# Patient Record
Sex: FEMALE | Race: BLACK OR AFRICAN AMERICAN | NOT HISPANIC OR LATINO | Employment: FULL TIME | URBAN - METROPOLITAN AREA
[De-identification: names, ages, dates, MRNs, and addresses within clinical notes are randomized per-mention and may not be internally consistent; named-entity substitution may affect disease eponyms.]

---

## 2020-07-20 ENCOUNTER — OFFICE VISIT (OUTPATIENT)
Dept: INTERNAL MEDICINE | Facility: CLINIC | Age: 25
End: 2020-07-20

## 2020-07-20 ENCOUNTER — LAB (OUTPATIENT)
Dept: LAB | Facility: HOSPITAL | Age: 25
End: 2020-07-20

## 2020-07-20 VITALS
HEIGHT: 64 IN | RESPIRATION RATE: 16 BRPM | DIASTOLIC BLOOD PRESSURE: 72 MMHG | OXYGEN SATURATION: 90 % | SYSTOLIC BLOOD PRESSURE: 102 MMHG | BODY MASS INDEX: 17.11 KG/M2 | WEIGHT: 100.2 LBS | HEART RATE: 74 BPM | TEMPERATURE: 97.8 F

## 2020-07-20 DIAGNOSIS — R49.0 HOARSENESS: ICD-10-CM

## 2020-07-20 DIAGNOSIS — Z11.59 ENCOUNTER FOR HEPATITIS C SCREENING TEST FOR LOW RISK PATIENT: ICD-10-CM

## 2020-07-20 DIAGNOSIS — R10.31 RLQ ABDOMINAL PAIN: Primary | ICD-10-CM

## 2020-07-20 DIAGNOSIS — R10.31 RLQ ABDOMINAL PAIN: ICD-10-CM

## 2020-07-20 PROCEDURE — 99204 OFFICE O/P NEW MOD 45 MIN: CPT | Performed by: INTERNAL MEDICINE

## 2020-07-20 RX ORDER — FLUTICASONE PROPIONATE 50 MCG
2 SPRAY, SUSPENSION (ML) NASAL DAILY
Qty: 1 BOTTLE | Refills: 5 | Status: SHIPPED | OUTPATIENT
Start: 2020-07-20 | End: 2021-06-29

## 2020-07-20 NOTE — PATIENT INSTRUCTIONS
Low-FODMAP Eating Plan    FODMAPs (fermentable oligosaccharides, disaccharides, monosaccharides, and polyols) are sugars that are hard for some people to digest. A low-FODMAP eating plan may help some people who have bowel (intestinal) diseases to manage their symptoms.  This meal plan can be complicated to follow. Work with a diet and nutrition specialist (dietitian) to make a low-FODMAP eating plan that is right for you. A dietitian can make sure that you get enough nutrition from this diet.  What are tips for following this plan?  Reading food labels  · Check labels for hidden FODMAPs such as:  ? High-fructose syrup.  ? Honey.  ? Agave.  ? Natural fruit flavors.  ? Onion or garlic powder.  · Choose low-FODMAP foods that contain 3-4 grams of fiber per serving.  · Check food labels for serving sizes. Eat only one serving at a time to make sure FODMAP levels stay low.  Meal planning  · Follow a low-FODMAP eating plan for up to 6 weeks, or as told by your health care provider or dietitian.  · To follow the eating plan:  1. Eliminate high-FODMAP foods from your diet completely.  2. Gradually reintroduce high-FODMAP foods into your diet one at a time. Most people should wait a few days after introducing one high-FODMAP food before they introduce the next high-FODMAP food. Your dietitian can recommend how quickly you may reintroduce foods.  3. Keep a daily record of what you eat and drink, and make note of any symptoms that you have after eating.  4. Review your daily record with a dietitian regularly. Your dietitian can help you identify which foods you can eat and which foods you should avoid.  General tips  · Drink enough fluid each day to keep your urine pale yellow.  · Avoid processed foods. These often have added sugar and may be high in FODMAPs.  · Avoid most dairy products, whole grains, and sweeteners.  · Work with a dietitian to make sure you get enough fiber in your diet.  Recommended  "foods  Grains  · Gluten-free grains, such as rice, oats, buckwheat, quinoa, corn, polenta, and millet. Gluten-free pasta, bread, or cereal. Rice noodles. Corn tortillas.  Vegetables  · Eggplant, zucchini, cucumber, peppers, green beans, [place] sprouts, bean sprouts, lettuce, arugula, kale, Swiss chard, spinach, annabel greens, bok sissy, summer squash, potato, and tomato. Limited amounts of corn, carrot, and sweet potato. Green parts of scallions.  Fruits  · Bananas, oranges, le, limes, blueberries, raspberries, strawberries, grapes, cantaloupe, honeydew melon, kiwi, papaya, passion fruit, and pineapple. Limited amounts of dried cranberries, banana chips, and shredded coconut.  Dairy  · Lactose-free milk, yogurt, and kefir. Lactose-free cottage cheese and ice cream. Non-dairy milks, such as almond, coconut, hemp, and rice milk. Yogurts made of non-dairy milks. Limited amounts of goat cheese, brie, mozzarella, parmesan, swiss, and other hard cheeses.  Meats and other protein foods  · Unseasoned beef, pork, poultry, or fish. Eggs. Vidal. Tofu (firm) and tempeh. Limited amounts of nuts and seeds, such as almonds, walnuts, brazil nuts, pecans, peanuts, pumpkin seeds, saeed seeds, and sunflower seeds.  Fats and oils  · Butter-free spreads. Vegetable oils, such as olive, canola, and sunflower oil.  Seasoning and other foods  · Artificial sweeteners with names that do not end in \"ol\" such as aspartame, saccharine, and stevia. Maple syrup, white table sugar, raw sugar, brown sugar, and molasses. Fresh basil, coriander, parsley, rosemary, and thyme.  Beverages  · Water and mineral water. Sugar-sweetened soft drinks. Small amounts of orange juice or cranberry juice. Black and green tea. Most dry sarita. Coffee.  This may not be a complete list of low-FODMAP foods. Talk with your dietitian for more information.  Foods to avoid  Grains  · Wheat, including kamut, durum, and semolina. Barley and bulgur. Couscous. Wheat-based " cereals. Wheat noodles, bread, crackers, and pastries.  Vegetables  · Chicory root, artichoke, asparagus, cabbage, snow peas, sugar snap peas, mushrooms, and cauliflower. Onions, garlic, leeks, and the white part of scallions.  Fruits  · Fresh, dried, and juiced forms of apple, pear, watermelon, peach, plum, cherries, apricots, blackberries, boysenberries, figs, nectarines, and gena. Avocado.  Dairy  · Milk, yogurt, ice cream, and soft cheese. Cream and sour cream. Milk-based sauces. Custard.  Meats and other protein foods  · Fried or fatty meat. Sausage. Cashews and pistachios. Soybeans, baked beans, black beans, chickpeas, kidney beans, helder beans, navy beans, lentils, and split peas.  Seasoning and other foods  · Any sugar-free gum or candy. Foods that contain artificial sweeteners such as sorbitol, mannitol, isomalt, or xylitol. Foods that contain honey, high-fructose corn syrup, or agave. Bouillon, vegetable stock, beef stock, and chicken stock. Garlic and onion powder. Condiments made with onion, such as hummus, chutney, pickles, relish, salad dressing, and salsa. Tomato paste.  Beverages  · Chicory-based drinks. Coffee substitutes. Chamomile tea. Fennel tea. Sweet or fortified sarita such as port or freddy. Diet soft drinks made with isomalt, mannitol, maltitol, sorbitol, or xylitol. Apple, pear, and gena juice. Juices with high-fructose corn syrup.  This may not be a complete list of high-FODMAP foods. Talk with your dietitian to discuss what dietary choices are best for you.   Summary  · A low-FODMAP eating plan is a short-term diet that eliminates FODMAPs from your diet to help ease symptoms of certain bowel diseases.  · The eating plan usually lasts up to 6 weeks. After that, high-FODMAP foods are restarted gradually, one at a time, so you can find out which may be causing symptoms.  · A low-FODMAP eating plan can be complicated. It is best to work with a dietitian who has experience with this type of  plan.  This information is not intended to replace advice given to you by your health care provider. Make sure you discuss any questions you have with your health care provider.  Document Released: 08/14/2018 Document Revised: 11/30/2018 Document Reviewed: 08/14/2018  Elsevier Patient Education © 2020 [employer]

## 2020-07-20 NOTE — PROGRESS NOTES
Internal Medicine New Patient  Lissa Cortés is a 24 y.o. female who presents today to establish care and with concerns as outlined below.    Chief Complaint  Chief Complaint   Patient presents with   • Establish Care     Throat pain after talking for extedned periods, and after yelling   • Abdominal Pain     Left lower quad, stabbing pain after eating,going to bathroom does not relieve it,x2weeks        HPI  Lissa comes in today to establish care. She had previously been seen by Dr. Hill and an APRN named Meri but last seen about 2 years ago. She has no chronic medical problems and takes no medications. She has never had pap smear.    She notes intermittent dull aching pain in RLQ. The pain has been present for the last 3 months and occurs about once a week. Feels like a fullness and pressure. The pain is associated with eating. The pain is not relieved with BM. She has BM 1-2 times a day. She denies diarrhea or constipation. She does not have blood in stool. She denies change in weight. She has not had N/V, fever, night sweats, dysuria, vaginal discharge, vaginal irritation. Pain is relieved with rest, will go away after sleeping. She denies issues with menstruation. LMP 1 week ago.    She feels that voice has been straining with prolonged talking. May get dry cough and throat may get sore. Has to clear throat often.       Review of Systems  Review of Systems   Constitutional: Negative.    HENT: Positive for postnasal drip, sore throat and voice change.    Eyes: Negative.    Respiratory: Negative.    Cardiovascular: Negative.    Gastrointestinal: Positive for abdominal pain. Negative for abdominal distention, anal bleeding, blood in stool, constipation, diarrhea, nausea, vomiting, GERD and indigestion.   Genitourinary: Negative.    Musculoskeletal: Negative.    Neurological: Negative.    Psychiatric/Behavioral: Negative.         Past Medical History  History reviewed. No pertinent past medical history.  "    Surgical History  History reviewed. No pertinent surgical history.     Family History  Family History   Problem Relation Age of Onset   • Hyperlipidemia Father    • Hypertension Father    • Diabetes Maternal Grandmother         Social History  Social History     Socioeconomic History   • Marital status: Single     Spouse name: Not on file   • Number of children: Not on file   • Years of education: Not on file   • Highest education level: Not on file   Tobacco Use   • Smoking status: Never Smoker   • Smokeless tobacco: Never Used   Substance and Sexual Activity   • Alcohol use: Yes     Comment: Socially   • Drug use: Never   • Sexual activity: Not Currently        Current Medications  No current outpatient medications on file prior to visit.     No current facility-administered medications on file prior to visit.        Allergies  No Known Allergies     Objective  Visit Vitals  /72   Pulse 74   Temp 97.8 °F (36.6 °C)   Resp 16   Ht 162.6 cm (64\")   Wt 45.5 kg (100 lb 3.2 oz)   SpO2 90%   BMI 17.20 kg/m²        Physical Exam  Physical Exam   Constitutional: She is oriented to person, place, and time. She appears well-developed and well-nourished. No distress.   HENT:   Head: Normocephalic and atraumatic.   Right Ear: External ear normal.   Left Ear: External ear normal.   Nose: Nose normal.   Mouth/Throat: Mucous membranes are normal. Posterior oropharyngeal erythema (cobblestoning, post nasal drainage) present. No oropharyngeal exudate. Tonsils are 1+ on the right. Tonsils are 1+ on the left. No tonsillar exudate.   Eyes: Pupils are equal, round, and reactive to light. Conjunctivae and EOM are normal. No scleral icterus.   Neck: Neck supple. No thyromegaly present.   Cardiovascular: Normal rate, regular rhythm and normal heart sounds.   No murmur heard.  Pulmonary/Chest: Effort normal and breath sounds normal. No respiratory distress.   Abdominal: Soft. Normal appearance and bowel sounds are normal. She " exhibits no distension and no mass. There is no hepatosplenomegaly. There is tenderness in the right lower quadrant. There is no rigidity, no rebound, no guarding, no CVA tenderness, no tenderness at McBurney's point and negative Frances's sign. No hernia.   Musculoskeletal: She exhibits no edema or deformity.   Lymphadenopathy:     She has no cervical adenopathy.   Neurological: She is alert and oriented to person, place, and time.   Skin: Skin is warm and dry. No rash noted. She is not diaphoretic.   Psychiatric: She has a normal mood and affect. Her behavior is normal. Judgment and thought content normal.   Nursing note and vitals reviewed.       Results  No results found for this or any previous visit.     Assessment and Plan  Lissa was seen today for establish care and abdominal pain.    Diagnoses and all orders for this visit:    RLQ abdominal pain  - Intermittent dull RLQ pain not associated with change in bowel habits, N/V, fever, weight loss, urinary symptoms. Notes onset of pain after eating. Nontoxic appearing and exam negative for significant findings except for mild tenderness on deep palpation of RLQ.   - Etiology of her pain at this time either dietary intolerance versus ovarian cyst or other  pathology. Has never had a pap smear.  - Will obtain CBC and CMP. Declines UPT, adamant that she is not pregnant.  - Advised to keep food diary and eliminate potential causative foods.  - Will follow up in 1 month for pap smear and if pain persists order TVUS to evaluate ovaries, uterus.    Hoarseness  - Exam with postnasal drainage which may be cause of her voice changes, soreness, frequent throat clearing.  - Flonase prescribed.    Encounter for hepatitis C screening test for low risk patient  - HCV ab ordered    Health Maintenance   Topic Date Due   • HPV VACCINES (1 - Female 2-dose series) 12/04/2007   • TDAP/TD VACCINES (2 - Td) 08/07/2018   • HEPATITIS C SCREENING  07/20/2020   • MEDICARE ANNUAL  WELLNESS  07/20/2020   • CHLAMYDIA SCREENING  07/20/2020   • PAP SMEAR  07/20/2020   • INFLUENZA VACCINE  08/01/2020     Health Maintenance  - Pap smear: plan for pap smear at next visit  - Mammogram: Start screening at age 40.  - Colonoscopy: Start screening at age 45.  - HCV: ordered  - Immunizations: Tdap due, discuss next visit.  - Depression screening: negative 7/2020    Return in about 1 month (around 8/20/2020) for pap smear 30 minutes please, Labs today.

## 2021-06-29 ENCOUNTER — OFFICE VISIT (OUTPATIENT)
Dept: INTERNAL MEDICINE | Facility: CLINIC | Age: 26
End: 2021-06-29

## 2021-06-29 VITALS
DIASTOLIC BLOOD PRESSURE: 70 MMHG | HEIGHT: 64 IN | BODY MASS INDEX: 16.9 KG/M2 | TEMPERATURE: 97.3 F | SYSTOLIC BLOOD PRESSURE: 108 MMHG | RESPIRATION RATE: 16 BRPM | OXYGEN SATURATION: 99 % | WEIGHT: 99 LBS | HEART RATE: 90 BPM

## 2021-06-29 DIAGNOSIS — Z71.84 TRAVEL ADVICE ENCOUNTER: Primary | ICD-10-CM

## 2021-06-29 DIAGNOSIS — Z01.419 WELL WOMAN EXAM WITH ROUTINE GYNECOLOGICAL EXAM: ICD-10-CM

## 2021-06-29 LAB
INDURATION: 0 MM (ref 0–10)
Lab: NORMAL
TB SKIN TEST: NEGATIVE

## 2021-06-29 PROCEDURE — 99213 OFFICE O/P EST LOW 20 MIN: CPT | Performed by: INTERNAL MEDICINE

## 2021-07-02 ENCOUNTER — TELEPHONE (OUTPATIENT)
Dept: INTERNAL MEDICINE | Facility: CLINIC | Age: 26
End: 2021-07-02

## 2021-07-02 NOTE — TELEPHONE ENCOUNTER
Patient stopped by to get print out of tb skin test results. Logan should be working on this please give patient a call when ready for      Please advise     PLEASE DISREGARD MESSAGE PT STOPPED IN AND PICKED UP FORM

## 2021-08-23 ENCOUNTER — OFFICE VISIT (OUTPATIENT)
Dept: INTERNAL MEDICINE | Facility: CLINIC | Age: 26
End: 2021-08-23

## 2021-08-23 VITALS
SYSTOLIC BLOOD PRESSURE: 112 MMHG | RESPIRATION RATE: 16 BRPM | HEIGHT: 64 IN | HEART RATE: 99 BPM | BODY MASS INDEX: 17.75 KG/M2 | OXYGEN SATURATION: 99 % | TEMPERATURE: 97.3 F | WEIGHT: 104 LBS | DIASTOLIC BLOOD PRESSURE: 60 MMHG

## 2021-08-23 DIAGNOSIS — Z72.51 UNPROTECTED SEX: ICD-10-CM

## 2021-08-23 DIAGNOSIS — Z00.00 ANNUAL PHYSICAL EXAM: Primary | ICD-10-CM

## 2021-08-23 DIAGNOSIS — Z13.1 SCREENING FOR DIABETES MELLITUS: ICD-10-CM

## 2021-08-23 PROCEDURE — 99395 PREV VISIT EST AGE 18-39: CPT | Performed by: INTERNAL MEDICINE

## 2021-08-23 NOTE — PROGRESS NOTES
Internal Medicine Annual Exam  Lissa Cortés is a 25 y.o. female who presents today for an annual exam and with concerns as outlined below.    Chief Complaint  Chief Complaint   Patient presents with   • Annual Exam        HPI  Lissa comes in today for annual physical. She also brings paperwork with her for her upcoming travel to [place] x9 months. She continues to feel well. No medications. She has recently become sexually active. She currently has a single male partner. She generally uses condoms but had one episode of unprotected sex recently. No symptoms but worries about STI. She does not think she is pregnant and defers pregnancy test as she worries her parents will see that she had this ordered. She is on their insurance. She is not physically active but plans to join the gym once in [place]. She leaves on 9/9.  She is UTD with vision and dental exams. She has had her COVID vaccines. She is not sure of the status of other vaccinations but will check with her parents. She has an appt on 8/26 with Dr. Calderon for pap smear and plans to discuss contraception as well.       Review of Systems  Review of Systems   Constitutional: Negative.    Eyes: Negative.    Respiratory: Negative.    Cardiovascular: Negative.    Gastrointestinal: Negative.    Genitourinary: Negative.    Musculoskeletal: Negative.    Skin: Negative.    Neurological: Negative.    Psychiatric/Behavioral: Negative.         Past Medical History  History reviewed. No pertinent past medical history.     Surgical History  Past Surgical History:   Procedure Laterality Date   • WISDOM TOOTH EXTRACTION Bilateral         Family History  Family History   Problem Relation Age of Onset   • Hyperlipidemia Father    • Hypertension Father    • Diabetes Maternal Grandmother         Social History  Social History     Socioeconomic History   • Marital status: Single     Spouse name: Not on file   • Number of children: Not on file   • Years of education: Not on  "file   • Highest education level: Not on file   Tobacco Use   • Smoking status: Never Smoker   • Smokeless tobacco: Never Used   Vaping Use   • Vaping Use: Never used   Substance and Sexual Activity   • Alcohol use: Yes     Comment: Socially, can drink a bottle of wine in a day   • Drug use: Never   • Sexual activity: Not Currently     Partners: Male     Birth control/protection: Condom        Current Medications  No current outpatient medications on file prior to visit.     No current facility-administered medications on file prior to visit.       Allergies  No Known Allergies     Objective  Visit Vitals  /60   Pulse 99   Temp 97.3 °F (36.3 °C)   Resp 16   Ht 162.6 cm (64.02\")   Wt 47.2 kg (104 lb)   SpO2 99%   BMI 17.84 kg/m²        Physical Exam  Physical Exam  Vitals and nursing note reviewed.   Constitutional:       General: She is not in acute distress.     Appearance: Normal appearance. She is well-developed and normal weight. She is not ill-appearing, toxic-appearing or diaphoretic.   HENT:      Head: Normocephalic and atraumatic.      Right Ear: Tympanic membrane, ear canal and external ear normal.      Left Ear: Tympanic membrane, ear canal and external ear normal.      Nose: Nose normal.   Eyes:      General: No scleral icterus.     Extraocular Movements: Extraocular movements intact.      Conjunctiva/sclera: Conjunctivae normal.      Pupils: Pupils are equal, round, and reactive to light.   Cardiovascular:      Rate and Rhythm: Normal rate and regular rhythm.      Heart sounds: Normal heart sounds.   Pulmonary:      Effort: Pulmonary effort is normal. No respiratory distress.      Breath sounds: Normal breath sounds.   Abdominal:      General: Bowel sounds are normal. There is no distension.      Palpations: Abdomen is soft. There is no mass.      Tenderness: There is no abdominal tenderness.   Musculoskeletal:         General: No deformity.      Cervical back: Neck supple.      Right lower leg: No " edema.      Left lower leg: No edema.   Lymphadenopathy:      Cervical: No cervical adenopathy.   Skin:     General: Skin is warm and dry.      Findings: No rash.   Neurological:      Mental Status: She is alert and oriented to person, place, and time. Mental status is at baseline.      Gait: Gait normal.   Psychiatric:         Mood and Affect: Mood normal.         Behavior: Behavior normal.         Thought Content: Thought content normal.         Judgment: Judgment normal.          Results  Results for orders placed or performed in visit on 06/29/21   TB Skin Test    Specimen: Blood   Result Value Ref Range    TB Skin Test Negative     Induration 0 0 - 10 mm    Injection Date & Time 06/29/2021@ 1028         Assessment and Plan  Diagnoses and all orders for this visit:    Annual physical exam  - Counseling was given to patient for the following topics:  appropriate exercise, healthy eating habits, disease prevention and importance of immunizations, including risks and benefits. Also discussed the importance of regular dental and vision care.    Unprotected sex  - Discussed need to use condoms consistently and to discuss contraception with GYN at upcoming appt.  - Will screen for STI with HCV ab, RPR, HIV ab, urine testing for chlamydia, gonorrhea, trichomonas  - Defers pregnancy test today due to concern parents would see test through insurance billing. Discussed that she could [facility] or [facility] as alternatives.    Screening for diabetes mellitus  - A1c ordered.    Health Maintenance   Topic Date Due   • ANNUAL PHYSICAL  Never done   • TDAP/TD VACCINES (2 - Td or Tdap) 08/07/2018   • HEPATITIS C SCREENING  Never done   • PAP SMEAR  Never done   • INFLUENZA VACCINE  10/01/2021   • COVID-19 Vaccine  Completed   • HPV VACCINES  Completed   • Pneumococcal Vaccine 0-64  Aged Out     Health Maintenance  - Pap smear: upcoming GYN appt  - Mammogram: Start screening at age 40.  -  Colonoscopy: Start screening at age 45.  - HCV: ordered but not completed  - Immunizations: COVID complete. Discussed Tdap.  - Depression screening: negative 8/2021     Return in about 1 year (around 8/23/2022) for Annual.

## 2022-11-14 ENCOUNTER — OFFICE VISIT (OUTPATIENT)
Dept: INTERNAL MEDICINE | Facility: CLINIC | Age: 27
End: 2022-11-14

## 2022-11-14 VITALS
HEART RATE: 71 BPM | HEIGHT: 64 IN | SYSTOLIC BLOOD PRESSURE: 112 MMHG | BODY MASS INDEX: 17.34 KG/M2 | DIASTOLIC BLOOD PRESSURE: 78 MMHG | RESPIRATION RATE: 12 BRPM | WEIGHT: 101.6 LBS | OXYGEN SATURATION: 97 %

## 2022-11-14 DIAGNOSIS — Z13.220 SCREENING FOR LIPID DISORDERS: ICD-10-CM

## 2022-11-14 DIAGNOSIS — Z11.3 SCREEN FOR STD (SEXUALLY TRANSMITTED DISEASE): ICD-10-CM

## 2022-11-14 DIAGNOSIS — Z78.9 OCCASIONAL ALCOHOL CONSUMPTION: ICD-10-CM

## 2022-11-14 DIAGNOSIS — Z00.00 WELL WOMAN EXAM (NO GYNECOLOGICAL EXAM): ICD-10-CM

## 2022-11-14 DIAGNOSIS — Z11.59 ENCOUNTER FOR HEPATITIS C SCREENING TEST FOR LOW RISK PATIENT: ICD-10-CM

## 2022-11-14 DIAGNOSIS — Z13.29 SCREENING FOR THYROID DISORDER: ICD-10-CM

## 2022-11-14 DIAGNOSIS — R87.619 ABNORMAL CERVICAL PAPANICOLAOU SMEAR, UNSPECIFIED ABNORMAL PAP FINDING: ICD-10-CM

## 2022-11-14 DIAGNOSIS — Z97.3 WEARS GLASSES: ICD-10-CM

## 2022-11-14 DIAGNOSIS — Z13.21 ENCOUNTER FOR VITAMIN DEFICIENCY SCREENING: ICD-10-CM

## 2022-11-14 DIAGNOSIS — Z83.3 FAMILY HISTORY OF DIABETES MELLITUS: ICD-10-CM

## 2022-11-14 DIAGNOSIS — Z13.1 SCREENING FOR DIABETES MELLITUS: ICD-10-CM

## 2022-11-14 DIAGNOSIS — Z78.9 NON-SMOKER: ICD-10-CM

## 2022-11-14 DIAGNOSIS — Z00.00 ENCOUNTER FOR WELL ADULT EXAM WITHOUT ABNORMAL FINDINGS: Primary | ICD-10-CM

## 2022-11-14 PROCEDURE — 87801 DETECT AGNT MULT DNA AMPLI: CPT | Performed by: NURSE PRACTITIONER

## 2022-11-14 PROCEDURE — 99213 OFFICE O/P EST LOW 20 MIN: CPT | Performed by: NURSE PRACTITIONER

## 2022-11-14 PROCEDURE — 87591 N.GONORRHOEAE DNA AMP PROB: CPT | Performed by: NURSE PRACTITIONER

## 2022-11-14 PROCEDURE — 87798 DETECT AGENT NOS DNA AMP: CPT | Performed by: NURSE PRACTITIONER

## 2022-11-14 PROCEDURE — 99395 PREV VISIT EST AGE 18-39: CPT | Performed by: NURSE PRACTITIONER

## 2022-11-14 PROCEDURE — 87491 CHLMYD TRACH DNA AMP PROBE: CPT | Performed by: NURSE PRACTITIONER

## 2022-11-14 PROCEDURE — 87661 TRICHOMONAS VAGINALIS AMPLIF: CPT | Performed by: NURSE PRACTITIONER

## 2022-11-14 NOTE — PROGRESS NOTES
Annual Physical     Name: Lissa Cortés    : 1995     MRN: [id number]     Chief Complaint  Annual Exam    Subjective     History of Present Illness:  Lissa Cortés is a 26 y.o. female who presents today for annual physical    Patient is requesting STD testing today as she has had a recent unprotected sexual encounter.  This was about 2 weeks ago    She is also requesting a referral for OB/GYN for Pap smear.  She did have an abnormal Pap smear with HPV and they recommended a follow-up in 6 months.  [place] so she was not able to have this completed.    Patient is working on getting up-to-date with the eye doctor and the dentist.  She has had a lot going on this year.  Her dentist also recently retired    Patient would like to return at a later date for the COVID-vaccine.  She is unsure of her last tetanus shot.  She is also unsure of the flu vaccine    Patient has not had a colonoscopy or mammogram.    Family history includes diabetes and glaucoma    Patient does not smoke, vape, use tobacco.  Only drinks alcohol occasionally.  No drug use    Patient asked if she could return at a later date for her lab work as she has moved to [place] and works up there    The patient is being seen for a health maintenance evaluation.    History reviewed. No pertinent past medical history.    Past Surgical History:   Procedure Laterality Date   • WISDOM TOOTH EXTRACTION Bilateral        Social History     Socioeconomic History   • Marital status: Single   Tobacco Use   • Smoking status: Never   • Smokeless tobacco: Never   Vaping Use   • Vaping Use: Never used   Substance and Sexual Activity   • Alcohol use: Not Currently     Comment: Social drinker, 2 drinks on those occasions   • Drug use: Never   • Sexual activity: Yes     Partners: Male     Birth control/protection: Condom       No current outpatient medications on file.    General History  Lissa  does not have regular  "dental visits.  Dentist recently retired and she is working to find an  She does complain of vision problems. Last eye exam was last year.  She does wear glasses.  Immunizations are not up to date. The patient needs the following immunizations: She is considering COVID, flu, tetanus in the future but not today    Lifestyle  Lissa  consumes in general, a \"healthy\" diet  .  She exercises intermittently.    Reproductive Health  Lissa  is premenopausal.  She reports periods are regular every 28-30 days.  She is sexually active. Her contraceptive plan is no method.    Screening  Last pap was last year with an abnormal result  Last Completed Pap Smear     This patient has no relevant Health Maintenance data.      . History of abnormal pap smear or family history of gyn cancer: recent abnormal pap. Needed follow up. She did not folow up  Last mammogram was never  Last Completed Mammogram     This patient has no relevant Health Maintenance data.      . Personal or family history of abnormal mammograms or breast cancer: none  Last colonoscopy was never  Last Completed Colonoscopy     This patient has no relevant Health Maintenance data.      . Family history of colon cancer: none   Last DEXA was never.    Health Maintenance Summary          Overdue - TDAP/TD VACCINES (2 - Td or Tdap) Overdue since 8/7/2018 08/07/2008  Imm Admin: Tdap          Overdue - HEPATITIS C SCREENING (Once) Overdue - never done    No completion, postpone, or frequency change history exists for this topic.          Overdue - PAP SMEAR (Every 3 Years) Overdue - never done    No completion, postpone, or frequency change history exists for this topic.          Overdue - COVID-19 Vaccine (3 - Booster for Moderna series) Overdue since 7/10/2021    05/15/2021  Imm Admin: COVID-19 (MODERNA) 1st, 2nd, 3rd Dose Only    05/15/2021  Imm Admin: COVID-19 (UNSPECIFIED)    04/06/2021  Imm Admin: COVID-19 (MODERNA) 1st, 2nd, 3rd Dose Only    04/06/2021  Imm " "Admin: COVID-19 (UNSPECIFIED)          Overdue - ANNUAL PHYSICAL (Yearly) Overdue since 8/24/2022 08/23/2021  Registry Metric: Last Annual Physical          HPV VACCINES (Series Information) Completed    03/03/2009  Imm Admin: HPV Quadrivalent    10/07/2008  Imm Admin: HPV Quadrivalent    08/07/2008  Imm Admin: HPV Quadrivalent          Pneumococcal Vaccine 0-64 (Series Information) Aged Out    No completion, postpone, or frequency change history exists for this topic.          Discontinued - INFLUENZA VACCINE  Discontinued    11/14/2022  Frequency changed to Never by Summer Scott APRN              Immunization History   Administered Date(s) Administered   • COVID-19 (MODERNA) 1st, 2nd, 3rd Dose Only 04/06/2021, 05/15/2021   • COVID-19 (UNSPECIFIED) 04/06/2021, 05/15/2021   • HPV Quadrivalent 08/07/2008, 10/07/2008, 03/03/2009   • Hep A, 2 Dose 08/07/2008, 03/03/2009   • Meningococcal MCV4P (Menactra) 08/07/2008, 08/06/2015   • Tdap 08/07/2008   • Varicella 08/07/2008       Review of Systems   Constitutional: Negative for fatigue and fever.   Respiratory: Negative for cough.    Cardiovascular: Negative for chest pain.   Genitourinary:        Requesting STD testing   Musculoskeletal: Negative for arthralgias.   Allergic/Immunologic: Negative for immunocompromised state.   Neurological: Negative for headaches.   Psychiatric/Behavioral: Negative for suicidal ideas. The patient is not nervous/anxious.        Objective     Vital Signs  /78   Pulse 71   Resp 12   Ht 162.6 cm (64.02\")   Wt 46.1 kg (101 lb 9.6 oz)   SpO2 97%   BMI 17.43 kg/m²   Estimated body mass index is 17.43 kg/m² as calculated from the following:    Height as of this encounter: 162.6 cm (64.02\").    Weight as of this encounter: 46.1 kg (101 lb 9.6 oz).    BMI is below normal parameters (malnutrition). Recommendations: Continue with healthy lifestyle      PHQ-9 Depression Screening  Little interest or pleasure in doing things? " 0-->not at all   Feeling down, depressed, or hopeless? 0-->not at all   Trouble falling or staying asleep, or sleeping too much?     Feeling tired or having little energy?     Poor appetite or overeating?     Feeling bad about yourself - or that you are a failure or have let yourself or your family down?     Trouble concentrating on things, such as reading the newspaper or watching television?     Moving or speaking so slowly that other people could have noticed? Or the opposite - being so fidgety or restless that you have been moving around a lot more than usual?     Thoughts that you would be better off dead, or of hurting yourself in some way?     PHQ-9 Total Score 0   If you checked off any problems, how difficult have these problems made it for you to do your work, take care of things at home, or get along with other people?       PHQ-9 Total Score: 0    TRAVON-7       Physical Exam  Vitals and nursing note reviewed.   Constitutional:       General: She is awake.      Appearance: Normal appearance. She is well-groomed and underweight.   HENT:      Head: Normocephalic and atraumatic.   Eyes:      Extraocular Movements: Extraocular movements intact.      Pupils: Pupils are equal, round, and reactive to light.      Comments: Glasses in place   Cardiovascular:      Rate and Rhythm: Normal rate and regular rhythm.      Pulses: Normal pulses.   Pulmonary:      Effort: Pulmonary effort is normal.      Breath sounds: Normal breath sounds.   Abdominal:      General: Bowel sounds are normal.      Palpations: Abdomen is soft.   Musculoskeletal:         General: Normal range of motion.   Skin:     General: Skin is warm and dry.   Neurological:      Mental Status: She is alert and oriented to person, place, and time.   Psychiatric:         Mood and Affect: Mood normal.         Behavior: Behavior normal. Behavior is cooperative.         Thought Content: Thought content normal.         Judgment: Judgment normal.                Assessment and Plan     Diagnoses and all orders for this visit:    1. Encounter for well adult exam without abnormal findings (Primary)    2. Screen for STD (sexually transmitted disease)  -     NuSwab VG+ - Swab, Vagina; Future  -     NuSwab VG+ - Swab, Vagina    3. Screening for lipid disorders    4. Screening for diabetes mellitus    5. Encounter for vitamin deficiency screening    6. Encounter for hepatitis C screening test for low risk patient    7. Well woman exam (no gynecological exam)    8. Screening for thyroid disorder    9. Wears glasses    10. Family history of diabetes mellitus    11. Adult BMI <19 kg/sq m    12. Non-smoker    13. Occasional alcohol consumption    14. Abnormal cervical Papanicolaou smear, unspecified abnormal pap finding    Plan  STD testing was ordered today.  Patient did provide the new swab sample.  She will return at a later date for blood work.  Will notify patient of results    Continue with healthy lifestyle    Please set up appointment with dentist and eye doctor    Referral placed for women's health for follow-up due to abnormal Pap smear    Go to ER if any condition worsens or severe    Follow-up 1 year for annual visit.  Patient was offered a sooner/intermittent appointment.  She declined at this time unless results come back abnormal    Consider COVID-vaccine, flu shot, tetanus shot    Follow Up  Return for 1 year annual visit.    MILA Muhammad    Part of this note may be an electronic transcription/translation of spoken language to printed text using the Dragon Dictation System.

## 2022-11-16 LAB
A VAGINAE DNA VAG QL NAA+PROBE: NORMAL SCORE
BVAB2 DNA VAG QL NAA+PROBE: NORMAL SCORE
C ALBICANS DNA VAG QL NAA+PROBE: NEGATIVE
C GLABRATA DNA VAG QL NAA+PROBE: NEGATIVE
C TRACH DNA VAG QL NAA+PROBE: NEGATIVE
MEGA1 DNA VAG QL NAA+PROBE: NORMAL SCORE
N GONORRHOEA DNA VAG QL NAA+PROBE: NEGATIVE
T VAGINALIS DNA VAG QL NAA+PROBE: NEGATIVE

## 2022-11-17 ENCOUNTER — TELEPHONE (OUTPATIENT)
Dept: INTERNAL MEDICINE | Facility: CLINIC | Age: 27
End: 2022-11-17

## 2022-11-17 NOTE — TELEPHONE ENCOUNTER
PT CALLED OFFICE RETURNING NILAY'S CALL REGARDING TEST RESULTS. PLEASE CALL THE PT BACK TO ADVISE.

## 2022-11-17 NOTE — TELEPHONE ENCOUNTER
Pt called back and I gave results, pt verbalized understanding and will come get labs done when she can.

## 2022-11-17 NOTE — TELEPHONE ENCOUNTER
----- Message from MILA Muhammad sent at 11/17/2022  9:05 AM EST -----  Please let patient know the vaginal swab returned.  It is negative for gonorrhea, chlamydia, trichomonas, yeast, bacterial vaginosis.  Please remind her to have her blood work obtained at her earliest convenience as well.  Thank you

## 2022-11-28 ENCOUNTER — TELEPHONE (OUTPATIENT)
Dept: INTERNAL MEDICINE | Facility: CLINIC | Age: 27
End: 2022-11-28

## 2022-11-28 NOTE — TELEPHONE ENCOUNTER
CALLER: Lissa Cortés    Relationship: Self    Best call back number: [phone]    What orders are you requesting (i.e. lab or imaging): LABS FOR PHYSICAL    In what timeframe would the patient need to come in: PATIENT WANTS PHYSICAL LABS TO BE EXTENDED DEC 5TH OR THE 12TH    Where will you receive your lab/imaging services: OUR OFFICE

## 2022-11-28 NOTE — TELEPHONE ENCOUNTER
Called and discussed time frame for labs with pt and she will come as soon as available to have done. Provided lab hours and pt verbalized her understanding.

## 2023-06-27 NOTE — PROGRESS NOTES
"Internal Medicine Follow Up    Chief Complaint  Lissa Cortés is a 25 y.o. female who presents today for follow up of chronic medical conditions outlined below.    Chief Complaint   Patient presents with   • Follow-up     Medical Certificate for travel        HPI  Ms. Cortés comes in for medical examination and paperwork to allow her to travel to [place]. She plans to travel to [place] in September as part of her [employer]hip. 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 She denies any current health issues and feels well. Her prior abdominal pain has resolved with increased fiber in her diet. She has been vaccinated for COVID19 and does not know of any other vaccinations that she will need to travel. She does need a TB skin test.       Review of Systems  Review of Systems   Constitutional: Negative.    Respiratory: Negative.    Cardiovascular: Negative.    Gastrointestinal: Negative.    Genitourinary: Negative.    Musculoskeletal: Negative.    Neurological: Negative.    Psychiatric/Behavioral: Negative.         Current Medications  Current Outpatient Medications on File Prior to Visit   Medication Sig Dispense Refill   • [DISCONTINUED] fluticasone (Flonase) 50 MCG/ACT nasal spray 2 sprays into the nostril(s) as directed by provider Daily. 1 bottle 5     No current facility-administered medications on file prior to visit.       Allergies  No Known Allergies    Objective  Visit Vitals  /70   Pulse 90   Temp 97.3 °F (36.3 °C)   Resp 16   Ht 162.6 cm (64.02\")   Wt 44.9 kg (99 lb)   SpO2 99%   BMI 16.98 kg/m²        Physical Exam  Physical Exam  Vitals and nursing note reviewed.   Constitutional:       General: She is not in acute distress.     Appearance: Normal appearance. She is well-developed and normal weight. She is not ill-appearing or toxic-appearing.   HENT:      Head: Normocephalic and atraumatic.      Right Ear: External ear normal.      Left Ear: External ear normal.   Eyes:      " Conjunctiva/sclera: Conjunctivae normal.   Cardiovascular:      Rate and Rhythm: Normal rate and regular rhythm.      Heart sounds: Normal heart sounds.   Pulmonary:      Effort: Pulmonary effort is normal. No respiratory distress.      Breath sounds: Normal breath sounds.   Abdominal:      General: Bowel sounds are normal. There is no distension.      Palpations: Abdomen is soft. There is no mass.      Tenderness: There is no abdominal tenderness.   Musculoskeletal:      Right lower leg: No edema.      Left lower leg: No edema.   Skin:     General: Skin is warm and dry.      Findings: No rash.   Neurological:      General: No focal deficit present.      Mental Status: She is alert and oriented to person, place, and time. Mental status is at baseline.      Gait: Gait normal.   Psychiatric:         Mood and Affect: Mood normal.         Behavior: Behavior normal.         Results  No results found for this or any previous visit.     Assessment and Plan  Diagnoses and all orders for this visit:    Travel advice encounter  - By exam today she is healthy and fit for travel. No evidence of communicable disease and fully vaccinated for COVID19.  - Will obtain TB skin test and pending results complete her medical certificate for travel    Well woman exam with routine gynecological exam  - Has not completed first pap smear due to concern for pain, discomfort. Discussed today and will refer to GYN.    Health Maintenance  - Pap smear: referring to GYN  - Mammogram: Start screening at age 40.  - Colonoscopy: Start screening at age 45.  - HCV: ordered but not completed  - Immunizations: COVID complete  - Depression screening: negative 7/2020     Return in about 2 months (around 8/29/2021) for Annual.   Adult

## 2024-07-29 ENCOUNTER — TELEPHONE (OUTPATIENT)
Dept: INTERNAL MEDICINE | Facility: CLINIC | Age: 29
End: 2024-07-29
Payer: COMMERCIAL

## 2024-07-29 NOTE — TELEPHONE ENCOUNTER
Caller: Lissa Cortés    Relationship: Self    Best call back number: [phone]     What medication are you requesting: MEFLOQUINE OR A SIMILAR ANTI-MALERIAL MEDICATION     What are your current symptoms: PATIENT WILL BE TRAVELING TO THE [facility] THE [facility] ON 8/3/24    How long have you been experiencing symptoms:     Have you had these symptoms before:    [] Yes  [] No    Have you been treated for these symptoms before:   [] Yes  [] No    If a prescription is needed, what is your preferred pharmacy and phone number: [id number] [address]  AT [facility] - [phone] [facility] [phone]      Additional notes: IF PCP IS AWARE OF ANY ADDITIONAL TREATMENT NEEDED TO TRAVEL PLEASE PRESCRIBE OR INFORM PATIENT IF SHE WILL NEED TO RECEIVE TREATMENT IN OFFICE.